# Patient Record
Sex: MALE | Race: ASIAN | NOT HISPANIC OR LATINO | Employment: FULL TIME | URBAN - METROPOLITAN AREA
[De-identification: names, ages, dates, MRNs, and addresses within clinical notes are randomized per-mention and may not be internally consistent; named-entity substitution may affect disease eponyms.]

---

## 2021-03-30 DIAGNOSIS — Z23 NEED FOR VACCINATION: Primary | ICD-10-CM

## 2021-03-30 PROCEDURE — 91300 COVID-19, MRNA, LNP-S, PF, 30 MCG/0.3 ML DOSE VACCINE: ICD-10-PCS | Mod: ,,, | Performed by: FAMILY MEDICINE

## 2021-03-30 PROCEDURE — 0001A COVID-19, MRNA, LNP-S, PF, 30 MCG/0.3 ML DOSE VACCINE: ICD-10-PCS | Mod: CV19,,, | Performed by: FAMILY MEDICINE

## 2021-03-30 PROCEDURE — 91300 COVID-19, MRNA, LNP-S, PF, 30 MCG/0.3 ML DOSE VACCINE: CPT | Mod: ,,, | Performed by: FAMILY MEDICINE

## 2021-03-30 PROCEDURE — 0001A COVID-19, MRNA, LNP-S, PF, 30 MCG/0.3 ML DOSE VACCINE: CPT | Mod: CV19,,, | Performed by: FAMILY MEDICINE

## 2021-04-20 ENCOUNTER — IMMUNIZATION (OUTPATIENT)
Dept: OBSTETRICS AND GYNECOLOGY | Facility: CLINIC | Age: 26
End: 2021-04-20

## 2021-04-20 DIAGNOSIS — Z23 NEED FOR VACCINATION: Primary | ICD-10-CM

## 2021-04-20 PROCEDURE — 0002A COVID-19, MRNA, LNP-S, PF, 30 MCG/0.3 ML DOSE VACCINE: CPT | Mod: CV19,,, | Performed by: FAMILY MEDICINE

## 2021-04-20 PROCEDURE — 0002A COVID-19, MRNA, LNP-S, PF, 30 MCG/0.3 ML DOSE VACCINE: ICD-10-PCS | Mod: CV19,,, | Performed by: FAMILY MEDICINE

## 2021-04-20 PROCEDURE — 91300 COVID-19, MRNA, LNP-S, PF, 30 MCG/0.3 ML DOSE VACCINE: ICD-10-PCS | Mod: ,,, | Performed by: FAMILY MEDICINE

## 2021-04-20 PROCEDURE — 91300 COVID-19, MRNA, LNP-S, PF, 30 MCG/0.3 ML DOSE VACCINE: CPT | Mod: ,,, | Performed by: FAMILY MEDICINE

## 2022-07-30 ENCOUNTER — OFFICE VISIT (OUTPATIENT)
Dept: URGENT CARE | Facility: CLINIC | Age: 27
End: 2022-07-30

## 2022-07-30 VITALS
HEIGHT: 69 IN | TEMPERATURE: 98 F | DIASTOLIC BLOOD PRESSURE: 126 MMHG | BODY MASS INDEX: 29.62 KG/M2 | WEIGHT: 200 LBS | RESPIRATION RATE: 18 BRPM | HEART RATE: 69 BPM | OXYGEN SATURATION: 98 % | SYSTOLIC BLOOD PRESSURE: 186 MMHG

## 2022-07-30 DIAGNOSIS — R03.0 ELEVATED BP WITHOUT DIAGNOSIS OF HYPERTENSION: ICD-10-CM

## 2022-07-30 DIAGNOSIS — U07.1 COVID-19: ICD-10-CM

## 2022-07-30 DIAGNOSIS — R05.9 COUGH: Primary | ICD-10-CM

## 2022-07-30 LAB
CTP QC/QA: YES
SARS-COV-2 RDRP RESP QL NAA+PROBE: POSITIVE

## 2022-07-30 PROCEDURE — 99214 OFFICE O/P EST MOD 30 MIN: CPT | Mod: S$GLB,,, | Performed by: NURSE PRACTITIONER

## 2022-07-30 PROCEDURE — U0002: ICD-10-PCS | Mod: QW,S$GLB,, | Performed by: NURSE PRACTITIONER

## 2022-07-30 PROCEDURE — 99214 PR OFFICE/OUTPT VISIT, EST, LEVL IV, 30-39 MIN: ICD-10-PCS | Mod: S$GLB,,, | Performed by: NURSE PRACTITIONER

## 2022-07-30 PROCEDURE — U0002 COVID-19 LAB TEST NON-CDC: HCPCS | Mod: QW,S$GLB,, | Performed by: NURSE PRACTITIONER

## 2022-07-30 RX ORDER — BENZONATATE 200 MG/1
200 CAPSULE ORAL 3 TIMES DAILY PRN
Qty: 30 CAPSULE | Refills: 0 | Status: SHIPPED | OUTPATIENT
Start: 2022-07-30 | End: 2022-08-09

## 2022-07-30 RX ORDER — GUAIFENESIN 600 MG/1
1200 TABLET, EXTENDED RELEASE ORAL 2 TIMES DAILY
Qty: 40 TABLET | Refills: 0 | Status: SHIPPED | OUTPATIENT
Start: 2022-07-30 | End: 2022-08-09

## 2022-07-30 NOTE — PROGRESS NOTES
"Subjective:       Patient ID: LINDA Watson is a 27 y.o. male.    Vitals:  height is 5' 9" (1.753 m) and weight is 90.7 kg (200 lb). His temperature is 97.6 °F (36.4 °C). His blood pressure is 186/126 (abnormal) and his pulse is 69. His respiration is 18 and oxygen saturation is 98%.     Chief Complaint: Cough    Patient presents to clinic reporting positive home COVID test Thursday. Current symptoms include sore throat, non productive cough, and vomiting. Denies fever, chills, diarrhea, rash. Advil, aleeve, and tylenol without relief. Nonsmoker.    Cough  This is a new problem. The current episode started in the past 7 days. The problem has been unchanged. The cough is productive of brown sputum. Associated symptoms include myalgias, nasal congestion, postnasal drip and a sore throat. Pertinent negatives include no chest pain, chills, ear congestion, ear pain, fever, headaches, heartburn, hemoptysis, rash, rhinorrhea, shortness of breath, sweats, weight loss or wheezing. The symptoms are aggravated by lying down. He has tried rest for the symptoms. The treatment provided mild relief.       Constitution: Negative for chills and fever.   HENT: Positive for postnasal drip and sore throat. Negative for ear pain.    Cardiovascular: Negative for chest pain.   Respiratory: Positive for cough. Negative for bloody sputum, shortness of breath and wheezing.    Gastrointestinal: Negative for heartburn.   Musculoskeletal: Positive for muscle ache.   Skin: Negative for rash.   Neurological: Negative for headaches.       Objective:      Physical Exam   Constitutional: He is oriented to person, place, and time. He appears well-developed. He is cooperative.  Non-toxic appearance. He does not appear ill. No distress.   HENT:   Head: Normocephalic and atraumatic.   Ears:   Right Ear: Hearing, tympanic membrane, external ear and ear canal normal.   Left Ear: Hearing, tympanic membrane, external ear and ear canal normal.   Nose: Nose " normal. No mucosal edema, rhinorrhea or nasal deformity. No epistaxis. Right sinus exhibits no maxillary sinus tenderness and no frontal sinus tenderness. Left sinus exhibits no maxillary sinus tenderness and no frontal sinus tenderness.   Mouth/Throat: Uvula is midline, oropharynx is clear and moist and mucous membranes are normal. No trismus in the jaw. Normal dentition. No uvula swelling. No oropharyngeal exudate, posterior oropharyngeal edema or posterior oropharyngeal erythema. Tonsils are 1+ on the right. Tonsils are 1+ on the left. No tonsillar exudate.   Eyes: Conjunctivae and lids are normal. No scleral icterus.   Neck: Trachea normal and phonation normal. Neck supple. No edema present. No erythema present. No neck rigidity present.   Cardiovascular: Normal rate, regular rhythm, normal heart sounds and normal pulses.   Pulmonary/Chest: Effort normal and breath sounds normal. No respiratory distress. He has no decreased breath sounds. He has no rhonchi.   Abdominal: Normal appearance.   Musculoskeletal: Normal range of motion.         General: No deformity. Normal range of motion.   Neurological: He is alert and oriented to person, place, and time. He exhibits normal muscle tone. Coordination normal.   Skin: Skin is warm, dry, intact, not diaphoretic and not pale.   Psychiatric: His speech is normal and behavior is normal. Judgment and thought content normal.   Nursing note and vitals reviewed.        Assessment:       1. Cough    2. COVID-19    3. Elevated BP without diagnosis of hypertension          Plan:         Cough  -     POCT COVID-19 Rapid Screening  -     benzonatate (TESSALON) 200 MG capsule; Take 1 capsule (200 mg total) by mouth 3 (three) times daily as needed for Cough.  Dispense: 30 capsule; Refill: 0  -     guaiFENesin (MUCINEX) 600 mg 12 hr tablet; Take 2 tablets (1,200 mg total) by mouth 2 (two) times daily. for 10 days  Dispense: 40 tablet; Refill: 0    COVID-19    Elevated BP without  diagnosis of hypertension  -     Ambulatory referral/consult to [facility]

## 2022-07-30 NOTE — LETTER
[address] ? [place], [address] ? Phone [phone] ? Fax [phone]           Return to Work/School    Patient: LINDA Watson  YOB: 1995   Date: 07/30/2022      To Whom It May Concern:     LINDA Watson was in contact with/seen in my office on 07/30/2022. COVID-19 is present in our communities across the [facility]. Not all patients are eligible or appropriate to be tested. In this situation, your employee meets the following criteria:     LINDA Waston has met the criteria for COVID-19 testing and has a POSITIVE result. He can return to work once they are asymptomatic for 24 hours without the use of fever reducing medications AND at least five days from the start of symptoms (or from the first positive result if they have no symptoms). Patient can return to work 8/2/22     If you have any questions or concerns, or if I can be of further assistance, please do not hesitate to contact me.     Sincerely,      Diego Marie DNP

## 2022-07-30 NOTE — PATIENT INSTRUCTIONS
Your test was POSITIVE for COVID-19 (coronavirus).       Please isolate yourself at home.  You may leave home and/or return to work once the following conditions are met:    If you were not hospitalized and are not moderately to severely immunocompromised:   More than 5 days since symptoms first appeared AND  More than 24 hours fever free without medications AND  Symptoms are improving  Continue to wear a mask around others for 5 additional days.    If you were hospitalized OR are moderately to severely immunocompromised:  More than 20 days since symptoms first appeared  More than 24 hours fever free without medications  Symptoms have improved    If you had no symptoms but tested positive:  More than 5 days since the date of the first positive test (20 days if moderately to severely immunocompromised). If you develop symptoms, then use the guidelines above.  Continue to wear a mask around others for 5 additional days.      Contact Tracing    As one of the next steps, you will receive a call or text from the [place] Department of Health ([facility]) COVID-19 Tracing Team. See the contact information below so you know not to ignore the health departments call. It is important that you contact them back immediately so they can help.      Contact Tracer Number:  [phone]  Caller ID for most carriers: [facility] and [facility]     What is contact tracing?  Contact tracing is a process that helps identify everyone who has been in close contact with an infected person. Contact tracers let those people know they may have been exposed and guide them on next steps. Confidentiality is important for everyone; no one will be told who may have exposed them to the virus.  Your involvement is important. The more we know about where and how this virus is spreading, the better chance we have at stopping it from spreading further.  What does exposure mean?  Exposure means you have been within 6 feet for more than 15 minutes with a person who  has or had COVID-19.  What kind of questions do the contact tracers ask?  A contact tracer will confirm your basic contact information including name, address, phone number, and next of kin, as well as asking about any symptoms you may have had. Theyll also ask you how you think you may have gotten sick, such as places where you may have been exposed to the virus, and people you were with. Those names will never be shared with anyone outside of that call, and will only be used to help trace and stop the spread of the virus.   I have privacy concerns. How will the state use my information?  Your privacy about your health is important. All calls are completed using call centers that use the appropriate health privacy protection measures (HIPAA compliance), meaning that your patient information is safe. No one will ever ask you any questions related to immigration status. Your health comes first.   Do I have to participate?  You do not have to participate, but we strongly encourage you to. Contact tracing can help us catch and control new outbreaks as theyre developing to keep your friends and family safe.   What if I dont hear from anyone?  If you dont receive a call within 24 hours, you can call the number above right away to inquire about your status. That line is open from 8:00 am - 8:00 p.m., 7 days a week.  Contact tracing saves lives! Together, we have the power to beat this virus and keep our loved ones and neighbors safe.    For more information see CDC link below.      https://www.cdc.gov/coronavirus/2019-ncov/hcp/guidance-prevent-spread.html#precautions        Sources:  CDC, [place] Department [facility] and [facility]           Sincerely,     Diego Marie DNP

## 2025-06-14 ENCOUNTER — HOSPITAL ENCOUNTER (EMERGENCY)
Facility: HOSPITAL | Age: 30
Discharge: HOME | End: 2025-06-14
Attending: EMERGENCY MEDICINE
Payer: COMMERCIAL

## 2025-06-14 VITALS
OXYGEN SATURATION: 100 % | HEART RATE: 69 BPM | SYSTOLIC BLOOD PRESSURE: 110 MMHG | RESPIRATION RATE: 18 BRPM | TEMPERATURE: 99 F | WEIGHT: 200 LBS | BODY MASS INDEX: 36.8 KG/M2 | DIASTOLIC BLOOD PRESSURE: 62 MMHG | HEIGHT: 62 IN

## 2025-06-14 DIAGNOSIS — S01.81XA CHIN LACERATION, INITIAL ENCOUNTER: Primary | ICD-10-CM

## 2025-06-14 PROCEDURE — 99283 EMERGENCY DEPT VISIT LOW MDM: CPT | Mod: 25

## 2025-06-14 PROCEDURE — 63600175 PHARM REV CODE 636 W HCPCS

## 2025-06-14 PROCEDURE — 12011 RPR F/E/E/N/L/M 2.5 CM/<: CPT

## 2025-06-14 RX ORDER — LIDOCAINE HYDROCHLORIDE 10 MG/ML
5 INJECTION, SOLUTION EPIDURAL; INFILTRATION; INTRACAUDAL; PERINEURAL
Status: COMPLETED | OUTPATIENT
Start: 2025-06-14 | End: 2025-06-14

## 2025-06-14 RX ADMIN — LIDOCAINE HYDROCHLORIDE 50 MG: 10 INJECTION, SOLUTION EPIDURAL; INFILTRATION; INTRACAUDAL at 08:06

## 2025-06-15 NOTE — ED PROVIDER NOTES
Encounter Date: 6/14/2025       History     Chief Complaint   Patient presents with    Laceration     Chin laceration PTA. Bleeding active but controlled in triage. Requesting TDAP bolster and stitches if needed.     30-year-old male presents to the ED further evaluation as per sustaining chin laceration today.  Patient states he was helping his father move a metal table and accidentally struck his chin.  Bleeding was controlled prior to arrival.  Patient took an aspirin for pain.  No other injuries sustained.  No other acute complaints today.    The history is provided by the patient.     Review of patient's allergies indicates:  No Known Allergies  No past medical history on file.  No past surgical history on file.  No family history on file.  Social History[1]  Review of Systems   Constitutional:  Negative for chills and fever.   Respiratory:  Negative for shortness of breath.    Cardiovascular:  Negative for chest pain.   Gastrointestinal:  Negative for abdominal distention, abdominal pain, nausea and vomiting.   Musculoskeletal:  Negative for neck pain and neck stiffness.   Skin:  Positive for wound.       Physical Exam     Initial Vitals [06/14/25 1929]   BP Pulse Resp Temp SpO2   126/72 68 18 98 °F (36.7 °C) 99 %      MAP       --         Physical Exam    Vitals reviewed.  Constitutional: He appears well-developed and well-nourished. He is not diaphoretic. No distress.   HENT:   Head:       Eyes: EOM are normal.   Neck: Neck supple.   Cardiovascular:  Normal rate and normal heart sounds.           Pulmonary/Chest: Breath sounds normal. No respiratory distress. He has no wheezes.   Abdominal: Abdomen is soft. He exhibits no distension. There is no abdominal tenderness.   Musculoskeletal:      Cervical back: Neck supple.     Neurological: He is alert and oriented to person, place, and time. GCS score is 15. GCS eye subscore is 4. GCS verbal subscore is 5. GCS motor subscore is 6.         ED Course   Lac  Repair    Date/Time: 6/14/2025 7:56 PM    Performed by: Georgia Reveles PA-C  Authorized by: Krysta Mirza MD    Consent:     Consent obtained:  Verbal    Risks discussed:  Infection, poor cosmetic result, poor wound healing and pain  Laceration details:     Location:  Face    Face location:  Chin    Length (cm):  1.5    Depth (mm):  3  Exploration:     Hemostasis achieved with:  Direct pressure    Wound extent: areolar tissue violated and fascia violated    Treatment:     Area cleansed with:  Chlorhexidine    Amount of cleaning:  Standard    Irrigation solution:  Sterile saline  Skin repair:     Repair method:  Sutures    Suture size:  5-0    Wound skin closure material used: Prolene.    Suture technique:  Simple interrupted    Number of sutures:  5  Approximation:     Approximation:  Close  Repair type:     Repair type:  Simple  Post-procedure details:     Dressing:  Open (no dressing)    Procedure completion:  Tolerated well, no immediate complications    Labs Reviewed - No data to display       Imaging Results    None          Medications   LIDOcaine (PF) 10 mg/ml (1%) injection 50 mg (50 mg Infiltration Given by Provider 6/14/25 2014)     Medical Decision Making  Differential Diagnosis includes, but is not limited to:  Open fracture, vascular injury, tendon/ligament injury, nerve injury, retained foreign body, superficial laceration, abrasion.    ED management     30-year-old male presents to the ED further evaluation as per sustaining chin laceration today. Patient is not toxic appearing, hemodynamically stable and resting comfortably on bed. Patient is well-appearing.  Awake and alert.  Afebrile with vitals WNL. No distress on exam. After complete evaluation, including thorough history and physical exam, the patient's injury and laceration was deemed to be appropriate for primary closure in the ED. The wound was irrigated extensively and inspected for foreign body, underlying structure injury, or other  associated complications, and none were found. See procedure note above. Advised to keep sutures/staples and wound clean and dry. Avoid submersion underwater; use soap, clean water, and gentle scrubbing to clean area. Apply a new sterile bandage when existing bandage becomes dirty or saturated. Monitor the wound regularly for any evidence of infection, including redness, drainage, increasing pain, or fever. Follow-up with your PCP or return to ER as directed for wound re-check and suture/staple removal in 5-7 days.      I have discussed the specifics of the workup with the patient and the patient has verbalized understanding of the details of the workup, the diagnosis, the treatment plan, and the need for outpatient follow-up with PCP. ED precautions given. Discussed with pt about returning to the ED, if symptoms fail to improve or worsen.    RESULTS:  Documented in ED course.   Labs/ekg interpreted by myself       Voice recognition software utilized in this note. Typographical and content errors may occur with this process. While efforts are made to detect and correct such errors, in some cases errors will persist. For this reason, wording in this document should be considered in the proper context and not strictly verbatim.       Risk  Prescription drug management.               ED Course as of 06/14/25 2340   Sat Jun 14, 2025 1942 BP: 126/72 [NW]   1942 MAP (mmHg): 92 [NW]   1942 Temp: 98 °F (36.7 °C) [NW]   1942 Temp Source: Oral [NW]   1942 Pulse: 68 [NW]   1942 Resp: 18 [NW]   1942 SpO2: 99 % [NW]      ED Course User Index  [NW] Georgia Reveles PA-C                           Clinical Impression:  Final diagnoses:  [S01.81XA] Chin laceration, initial encounter (Primary)          ED Disposition Condition    Discharge Stable          ED Prescriptions    None       Follow-up Information    None              Georgia Reveles PA-C  06/14/25 2313         [1]   Social History  Tobacco Use    Smoking status: Never     Smokeless tobacco: Never   Substance Use Topics    Alcohol use: Never    Drug use: Never        Georgia Reveles PA-C  06/14/25 8525

## 2025-06-18 ENCOUNTER — HOSPITAL ENCOUNTER (EMERGENCY)
Facility: HOSPITAL | Age: 30
Discharge: HOME | End: 2025-06-18
Attending: EMERGENCY MEDICINE
Payer: COMMERCIAL

## 2025-06-18 VITALS
SYSTOLIC BLOOD PRESSURE: 135 MMHG | HEIGHT: 60 IN | DIASTOLIC BLOOD PRESSURE: 89 MMHG | HEART RATE: 63 BPM | BODY MASS INDEX: 39.27 KG/M2 | OXYGEN SATURATION: 97 % | RESPIRATION RATE: 20 BRPM | TEMPERATURE: 98 F | WEIGHT: 200 LBS

## 2025-06-18 DIAGNOSIS — Z48.02 VISIT FOR SUTURE REMOVAL: Primary | ICD-10-CM

## 2025-06-18 DIAGNOSIS — Z51.89 VISIT FOR WOUND CHECK: ICD-10-CM

## 2025-06-18 PROCEDURE — 99999 HC NO LEVEL OF SERVICE - ED ONLY: CPT

## 2025-06-18 NOTE — ED NOTES
Pt received to my care and reports he is here for suture removal. He states he had sutures placed to his Chin this past weekend. Suture area is C&D area is well approximated. No sings of infection.

## 2025-06-18 NOTE — ED PROVIDER NOTES
"Encounter Date: 6/18/2025       History     Chief Complaint   Patient presents with    Suture / Staple Removal     Requesting suture removal to chin. Denies any sx or complaints.      30-year-old male presents to ED for wound check and suture removal.  Sutures placed to chin 4 days ago.  He denies any acute complications and states "I am ready to get them out".  No other acute complaints at this time    The history is provided by the patient.     Review of patient's allergies indicates:  No Known Allergies  No past medical history on file.  No past surgical history on file.  No family history on file.  Social History[1]  Review of Systems   Skin:  Positive for wound.       Physical Exam     Initial Vitals [06/18/25 0938]   BP Pulse Resp Temp SpO2   135/89 63 20 98 °F (36.7 °C) 97 %      MAP       --         Physical Exam    Vitals reviewed.  Constitutional: Vital signs are normal. He appears well-developed and well-nourished. He is cooperative. He does not have a sickly appearance. He does not appear ill. No distress.   HENT:   Head: Normocephalic.   Chin laceration appears to be healing appropriately.  No erythema or drainage.  Sutures x5 intact   Eyes: EOM are normal.   Neck:   Normal range of motion.  Musculoskeletal:      Cervical back: Normal range of motion.     Neurological: He is alert and oriented to person, place, and time. GCS eye subscore is 4. GCS verbal subscore is 5. GCS motor subscore is 6.   Psychiatric: He has a normal mood and affect. His speech is normal and behavior is normal.         ED Course   Suture Removal    Date/Time: 6/18/2025 10:54 AM  Location procedure was performed: [facility] EMERGENCY DEPARTMENT    Performed by: Elton Trevizo PA-C  Authorized by: Frank Velazquez MD  Body area: head/neck  Location details: chin  Wound Appearance: clean, well healed and no drainage  Sutures Removed: 5  Staples Removed: 0  Post-removal: no dressing applied  Facility: sutures placed in this " facility  Patient tolerance: Patient tolerated the procedure well with no immediate complications        Labs Reviewed - No data to display       Imaging Results    None          Medications - No data to display  Medical Decision Making  Patient presents for wound check and suture removal.  Afebrile with vitals WNL.  Patient in no distress on exam    DDx:  Including but not limited to wound check, suture removal               ED Course as of 06/18/25 1014   Wed Jun 18, 2025   0958 Sutures removed without complications.  Patient tolerated well.  Encouraged to keep wound site clean and dry, monitor wound healing closely with outpatient follow up as necessary.  ED return precautions were discussed.  Patient states understanding and agrees with plan [KS]      ED Course User Index  [KS] Elton Trevizo PA-C                           Clinical Impression:  Final diagnoses:  [Z48.02] Visit for suture removal (Primary)  [Z51.89] Visit for wound check          ED Disposition Condition    Discharge Stable          ED Prescriptions    None       Follow-up Information       Follow up With Specialties Details Why Contact Info    Sujata Fernando MD Family Medicine   [address]  [place] [facility] [id number] [phone]                     [1]   Social History  Tobacco Use    Smoking status: Never    Smokeless tobacco: Never   Substance Use Topics    Alcohol use: Never    Drug use: Never        Elton Trevizo PA-C  06/18/25 1050

## 2025-06-18 NOTE — DISCHARGE INSTRUCTIONS
